# Patient Record
Sex: FEMALE | Race: WHITE | NOT HISPANIC OR LATINO
[De-identification: names, ages, dates, MRNs, and addresses within clinical notes are randomized per-mention and may not be internally consistent; named-entity substitution may affect disease eponyms.]

---

## 2017-04-25 ENCOUNTER — APPOINTMENT (OUTPATIENT)
Dept: ORTHOPEDIC SURGERY | Facility: CLINIC | Age: 67
End: 2017-04-25

## 2017-04-25 DIAGNOSIS — M15.1 HEBERDEN'S NODES (WITH ARTHROPATHY): ICD-10-CM

## 2017-04-25 DIAGNOSIS — D48.1 NEOPLASM OF UNCERTAIN BEHAVIOR OF CONNECTIVE AND OTHER SOFT TISSUE: ICD-10-CM

## 2017-04-25 RX ORDER — AZITHROMYCIN 250 MG/1
250 TABLET, FILM COATED ORAL
Qty: 6 | Refills: 0 | Status: ACTIVE | COMMUNITY
Start: 2016-12-08

## 2017-04-25 RX ORDER — RISEDRONATE SODIUM 150 MG/1
150 TABLET, FILM COATED ORAL
Qty: 3 | Refills: 0 | Status: ACTIVE | COMMUNITY
Start: 2017-04-07

## 2017-04-25 RX ORDER — TRIAMCINOLONE ACETONIDE 1 MG/G
0.1 CREAM TOPICAL
Qty: 80 | Refills: 0 | Status: ACTIVE | COMMUNITY
Start: 2017-04-18

## 2017-05-11 ENCOUNTER — APPOINTMENT (OUTPATIENT)
Dept: ORTHOPEDIC SURGERY | Facility: CLINIC | Age: 67
End: 2017-05-11

## 2017-05-11 RX ORDER — BENZONATATE 100 MG/1
100 CAPSULE ORAL
Qty: 30 | Refills: 0 | Status: ACTIVE | COMMUNITY
Start: 2017-04-28

## 2017-05-11 RX ORDER — FLUTICASONE PROPIONATE 50 UG/1
50 SPRAY, METERED NASAL
Qty: 16 | Refills: 0 | Status: ACTIVE | COMMUNITY
Start: 2017-04-28

## 2017-05-15 ENCOUNTER — OUTPATIENT (OUTPATIENT)
Dept: OUTPATIENT SERVICES | Facility: HOSPITAL | Age: 67
LOS: 1 days | End: 2017-05-15
Payer: MEDICARE

## 2017-05-15 VITALS
TEMPERATURE: 98 F | OXYGEN SATURATION: 98 % | HEART RATE: 64 BPM | DIASTOLIC BLOOD PRESSURE: 75 MMHG | HEIGHT: 61 IN | RESPIRATION RATE: 16 BRPM | SYSTOLIC BLOOD PRESSURE: 138 MMHG | WEIGHT: 147.05 LBS

## 2017-05-15 DIAGNOSIS — M67.441 GANGLION, RIGHT HAND: ICD-10-CM

## 2017-05-15 DIAGNOSIS — M25.841 OTHER SPECIFIED JOINT DISORDERS, RIGHT HAND: ICD-10-CM

## 2017-05-15 DIAGNOSIS — Z98.890 OTHER SPECIFIED POSTPROCEDURAL STATES: Chronic | ICD-10-CM

## 2017-05-15 DIAGNOSIS — M19.041 PRIMARY OSTEOARTHRITIS, RIGHT HAND: ICD-10-CM

## 2017-05-15 DIAGNOSIS — Z01.818 ENCOUNTER FOR OTHER PREPROCEDURAL EXAMINATION: ICD-10-CM

## 2017-05-15 LAB
ANION GAP SERPL CALC-SCNC: 15 MMOL/L — SIGNIFICANT CHANGE UP (ref 5–17)
BUN SERPL-MCNC: 26 MG/DL — HIGH (ref 7–23)
CALCIUM SERPL-MCNC: 9.3 MG/DL — SIGNIFICANT CHANGE UP (ref 8.4–10.5)
CHLORIDE SERPL-SCNC: 102 MMOL/L — SIGNIFICANT CHANGE UP (ref 96–108)
CO2 SERPL-SCNC: 26 MMOL/L — SIGNIFICANT CHANGE UP (ref 22–31)
CREAT SERPL-MCNC: 0.56 MG/DL — SIGNIFICANT CHANGE UP (ref 0.5–1.3)
GLUCOSE SERPL-MCNC: 86 MG/DL — SIGNIFICANT CHANGE UP (ref 70–99)
HCT VFR BLD CALC: 41.2 % — SIGNIFICANT CHANGE UP (ref 34.5–45)
HGB BLD-MCNC: 13.1 G/DL — SIGNIFICANT CHANGE UP (ref 11.5–15.5)
MCHC RBC-ENTMCNC: 30.4 PG — SIGNIFICANT CHANGE UP (ref 27–34)
MCHC RBC-ENTMCNC: 31.8 GM/DL — LOW (ref 32–36)
MCV RBC AUTO: 95.6 FL — SIGNIFICANT CHANGE UP (ref 80–100)
PLATELET # BLD AUTO: 272 K/UL — SIGNIFICANT CHANGE UP (ref 150–400)
POTASSIUM SERPL-MCNC: 4.8 MMOL/L — SIGNIFICANT CHANGE UP (ref 3.5–5.3)
POTASSIUM SERPL-SCNC: 4.8 MMOL/L — SIGNIFICANT CHANGE UP (ref 3.5–5.3)
RBC # BLD: 4.31 M/UL — SIGNIFICANT CHANGE UP (ref 3.8–5.2)
RBC # FLD: 13.9 % — SIGNIFICANT CHANGE UP (ref 10.3–14.5)
SODIUM SERPL-SCNC: 143 MMOL/L — SIGNIFICANT CHANGE UP (ref 135–145)
WBC # BLD: 7.31 K/UL — SIGNIFICANT CHANGE UP (ref 3.8–10.5)
WBC # FLD AUTO: 7.31 K/UL — SIGNIFICANT CHANGE UP (ref 3.8–10.5)

## 2017-05-15 PROCEDURE — 80048 BASIC METABOLIC PNL TOTAL CA: CPT

## 2017-05-15 PROCEDURE — 85027 COMPLETE CBC AUTOMATED: CPT

## 2017-05-15 RX ORDER — SODIUM CHLORIDE 9 MG/ML
3 INJECTION INTRAMUSCULAR; INTRAVENOUS; SUBCUTANEOUS EVERY 8 HOURS
Qty: 0 | Refills: 0 | Status: DISCONTINUED | OUTPATIENT
Start: 2017-05-19 | End: 2017-06-03

## 2017-05-15 RX ORDER — LIDOCAINE HCL 20 MG/ML
0.2 VIAL (ML) INJECTION ONCE
Qty: 0 | Refills: 0 | Status: DISCONTINUED | OUTPATIENT
Start: 2017-05-19 | End: 2017-06-03

## 2017-05-15 RX ORDER — CEFAZOLIN SODIUM 1 G
2000 VIAL (EA) INJECTION ONCE
Qty: 0 | Refills: 0 | Status: DISCONTINUED | OUTPATIENT
Start: 2017-05-19 | End: 2017-06-03

## 2017-05-15 RX ORDER — ACETAMINOPHEN 500 MG
975 TABLET ORAL ONCE
Qty: 0 | Refills: 0 | Status: COMPLETED | OUTPATIENT
Start: 2017-05-19 | End: 2017-05-19

## 2017-05-15 RX ORDER — CELECOXIB 200 MG/1
200 CAPSULE ORAL ONCE
Qty: 0 | Refills: 0 | Status: COMPLETED | OUTPATIENT
Start: 2017-05-19 | End: 2017-05-19

## 2017-05-15 NOTE — H&P PST ADULT - HISTORY OF PRESENT ILLNESS
66 y.o. female with h/o HTN c/o cyst of Right middle finger for 2 months. She is scheduled for Excision Mucous Cyst, Joint Debridement Distal Interphalangeal Joint Right Long Finger.

## 2017-05-15 NOTE — H&P PST ADULT - PMH
Cyst of joint of hand, right    Heart murmur    History of breast cancer, Right  right breast 11/1997  HTN (hypertension)    Hyperlipidemia    Hyperparathyroidism/history  11/2011  Leg skin lesion, left "pre-melanoma"  "pre-melanoma" excision 3/2013  OA (osteoarthritis) right hand    Osteoporosis Cyst of joint of hand, right    Heart murmur    History of breast cancer, Right  1997  HTN (hypertension)    Hyperlipidemia    Hyperparathyroidism/history  11/2011  Leg skin lesion, left "pre-melanoma"  excision 3/2013  OA (osteoarthritis) right hand    Osteoporosis

## 2017-05-15 NOTE — H&P PST ADULT - ATTENDING COMMENTS
The patient has a large right long finger mucous cyst with large dry central crust. The patient has bitten the cyst in the past. There is no sign of infection at this time, however, there is an increased risk of infection because of this and because of prior drainage. Surgery, cyst excision, joint debridement, possible skin flap rotation are indicated. There is very faint 1mm erythema at the base of the dry crust. Because of the dry translucent skin at the fingernail margin there is a possibility of retraction of the eponychial fold/cuticle resulting in asymmetry of the nail fold and cuticle postoperatively, that could result in a permanent skin/cosmetic deformity. I have stressed the increased risk of infection postoperatively. These and many other risks, complications, limitations have been reviewed and discussed in office and also preoperatively. In addition to the above risks, complications, expectations, post operative activities, wound care, stiffness, increased infection risk, chronic pain, skin scarring and cuticle deformity, need for therapy are just a few of many factors reviewed once again. All questions answered. Patient has expressed acceptance and understanding.

## 2017-05-15 NOTE — H&P PST ADULT - PSH
History of lumpectomy  right breast 11/1997  History of bilateral oophorectomy  "strong family hx of cancer", 7/1999  History of parathyroidectomy  11/2011

## 2017-05-19 ENCOUNTER — RESULT REVIEW (OUTPATIENT)
Age: 67
End: 2017-05-19

## 2017-05-19 ENCOUNTER — APPOINTMENT (OUTPATIENT)
Dept: ORTHOPEDIC SURGERY | Facility: HOSPITAL | Age: 67
End: 2017-05-19

## 2017-05-19 ENCOUNTER — OUTPATIENT (OUTPATIENT)
Dept: OUTPATIENT SERVICES | Facility: HOSPITAL | Age: 67
LOS: 1 days | End: 2017-05-19
Payer: MEDICARE

## 2017-05-19 VITALS
HEART RATE: 58 BPM | OXYGEN SATURATION: 99 % | DIASTOLIC BLOOD PRESSURE: 84 MMHG | WEIGHT: 147.05 LBS | RESPIRATION RATE: 16 BRPM | SYSTOLIC BLOOD PRESSURE: 152 MMHG | HEIGHT: 61 IN | TEMPERATURE: 99 F

## 2017-05-19 VITALS
HEART RATE: 76 BPM | RESPIRATION RATE: 16 BRPM | DIASTOLIC BLOOD PRESSURE: 90 MMHG | OXYGEN SATURATION: 99 % | SYSTOLIC BLOOD PRESSURE: 159 MMHG | TEMPERATURE: 97 F

## 2017-05-19 DIAGNOSIS — M19.041 PRIMARY OSTEOARTHRITIS, RIGHT HAND: ICD-10-CM

## 2017-05-19 DIAGNOSIS — Z98.890 OTHER SPECIFIED POSTPROCEDURAL STATES: Chronic | ICD-10-CM

## 2017-05-19 DIAGNOSIS — M67.441 GANGLION, RIGHT HAND: ICD-10-CM

## 2017-05-19 PROCEDURE — 88304 TISSUE EXAM BY PATHOLOGIST: CPT

## 2017-05-19 PROCEDURE — 26535 REVISE FINGER JOINT: CPT | Mod: F7

## 2017-05-19 PROCEDURE — 88304 TISSUE EXAM BY PATHOLOGIST: CPT | Mod: 26

## 2017-05-19 PROCEDURE — 26160 REMOVE TENDON SHEATH LESION: CPT | Mod: F7

## 2017-05-19 RX ORDER — CELECOXIB 200 MG/1
200 CAPSULE ORAL ONCE
Qty: 0 | Refills: 0 | Status: DISCONTINUED | OUTPATIENT
Start: 2017-05-19 | End: 2017-06-03

## 2017-05-19 RX ORDER — RISEDRONATE SODIUM 25.8; 4.2 MG/1; MG/1
325 TABLET, FILM COATED ORAL
Qty: 0 | Refills: 0 | COMMUNITY

## 2017-05-19 RX ORDER — OXYCODONE HYDROCHLORIDE 5 MG/1
5 TABLET ORAL ONCE
Qty: 0 | Refills: 0 | Status: DISCONTINUED | OUTPATIENT
Start: 2017-05-19 | End: 2017-05-19

## 2017-05-19 RX ORDER — ATENOLOL 25 MG/1
1 TABLET ORAL
Qty: 0 | Refills: 0 | COMMUNITY

## 2017-05-19 RX ORDER — ACETAMINOPHEN 500 MG
2 TABLET ORAL
Qty: 0 | Refills: 0 | COMMUNITY

## 2017-05-19 RX ORDER — SODIUM CHLORIDE 9 MG/ML
1000 INJECTION, SOLUTION INTRAVENOUS
Qty: 0 | Refills: 0 | Status: DISCONTINUED | OUTPATIENT
Start: 2017-05-19 | End: 2017-06-03

## 2017-05-19 RX ORDER — ASCORBIC ACID 60 MG
1 TABLET,CHEWABLE ORAL
Qty: 0 | Refills: 0 | COMMUNITY

## 2017-05-19 RX ORDER — CHOLECALCIFEROL (VITAMIN D3) 125 MCG
1 CAPSULE ORAL
Qty: 0 | Refills: 0 | COMMUNITY

## 2017-05-19 RX ORDER — ONDANSETRON 8 MG/1
4 TABLET, FILM COATED ORAL ONCE
Qty: 0 | Refills: 0 | Status: DISCONTINUED | OUTPATIENT
Start: 2017-05-19 | End: 2017-06-03

## 2017-05-19 RX ORDER — FAMOTIDINE 10 MG/ML
0 INJECTION INTRAVENOUS
Qty: 0 | Refills: 0 | COMMUNITY

## 2017-05-19 RX ORDER — ATORVASTATIN CALCIUM 80 MG/1
1 TABLET, FILM COATED ORAL
Qty: 0 | Refills: 0 | COMMUNITY

## 2017-05-19 RX ADMIN — Medication 975 MILLIGRAM(S): at 10:08

## 2017-05-19 RX ADMIN — CELECOXIB 200 MILLIGRAM(S): 200 CAPSULE ORAL at 10:08

## 2017-05-19 NOTE — ASU DISCHARGE PLAN (ADULT/PEDIATRIC). - MEDICATION SUMMARY - MEDICATIONS TO TAKE
I will START or STAY ON the medications listed below when I get home from the hospital:    calcium  -- 1200 milligram(s) by mouth once a day  -- Indication: For home med    Tylenol 325 mg oral tablet  -- 2 tab(s) by mouth every 4 hours, As Needed  -- Indication: For Pain    Lipitor 20 mg oral tablet  -- 1 tab(s) by mouth once a day  -- Indication: For home med    atenolol 25 mg oral tablet  -- 1 tab(s) by mouth once a day  -- Indication: For home med    Actonel  -- 325 milligram(s) by mouth once a day  -- Indication: For home med    Vitamin C 500 mg oral tablet  -- 1 tab(s) by mouth once a day  -- Indication: For home med    Vitamin D3 1000 intl units oral capsule  -- 1 cap(s) by mouth once a day  -- Indication: For home med

## 2017-05-19 NOTE — ASU DISCHARGE PLAN (ADULT/PEDIATRIC). - MEDICATION SUMMARY - MEDICATIONS TO STOP TAKING
I will STOP taking the medications listed below when I get home from the hospital:    Pepcid 20 mg oral tablet  --  by mouth 2 doses preop

## 2017-05-19 NOTE — ASU DISCHARGE PLAN (ADULT/PEDIATRIC). - NOTIFY
Numbness, color, or temperature change to extremity/Bleeding that does not stop/Swelling that continues/Pain not relieved by Medications Bleeding that does not stop/Fever greater than 101/Numbness, color, or temperature change to extremity/Pain not relieved by Medications/Swelling that continues

## 2017-05-19 NOTE — ASU PATIENT PROFILE, ADULT - PMH
Cyst of joint of hand, right    Heart murmur    History of breast cancer, Right  1997  HTN (hypertension)    Hyperlipidemia    Hyperparathyroidism/history  11/2011  Leg skin lesion, left "pre-melanoma"  excision 3/2013  OA (osteoarthritis) right hand    Osteoporosis

## 2017-05-20 ENCOUNTER — TRANSCRIPTION ENCOUNTER (OUTPATIENT)
Age: 67
End: 2017-05-20

## 2017-05-23 LAB — SURGICAL PATHOLOGY STUDY: SIGNIFICANT CHANGE UP

## 2017-05-30 ENCOUNTER — APPOINTMENT (OUTPATIENT)
Dept: ORTHOPEDIC SURGERY | Facility: CLINIC | Age: 67
End: 2017-05-30

## 2017-07-06 ENCOUNTER — TRANSCRIPTION ENCOUNTER (OUTPATIENT)
Age: 67
End: 2017-07-06

## 2017-07-18 ENCOUNTER — APPOINTMENT (OUTPATIENT)
Dept: ORTHOPEDIC SURGERY | Facility: CLINIC | Age: 67
End: 2017-07-18

## 2017-07-18 DIAGNOSIS — M19.041 PRIMARY OSTEOARTHRITIS, RIGHT HAND: ICD-10-CM

## 2017-07-18 DIAGNOSIS — M67.441 GANGLION, RIGHT HAND: ICD-10-CM

## 2018-01-31 ENCOUNTER — APPOINTMENT (OUTPATIENT)
Dept: SURGERY | Facility: CLINIC | Age: 68
End: 2018-01-31
Payer: MEDICARE

## 2018-01-31 PROCEDURE — 99213K: CUSTOM

## 2018-12-26 PROBLEM — R01.1 CARDIAC MURMUR, UNSPECIFIED: Chronic | Status: ACTIVE | Noted: 2017-05-15

## 2018-12-26 PROBLEM — M81.0 AGE-RELATED OSTEOPOROSIS WITHOUT CURRENT PATHOLOGICAL FRACTURE: Chronic | Status: ACTIVE | Noted: 2017-05-15

## 2018-12-26 PROBLEM — M25.841: Chronic | Status: ACTIVE | Noted: 2017-05-15

## 2019-01-14 ENCOUNTER — OUTPATIENT (OUTPATIENT)
Dept: OUTPATIENT SERVICES | Facility: HOSPITAL | Age: 69
LOS: 1 days | End: 2019-01-14
Payer: MEDICARE

## 2019-01-14 ENCOUNTER — APPOINTMENT (OUTPATIENT)
Dept: MAMMOGRAPHY | Facility: CLINIC | Age: 69
End: 2019-01-14
Payer: MEDICARE

## 2019-01-14 ENCOUNTER — APPOINTMENT (OUTPATIENT)
Dept: ULTRASOUND IMAGING | Facility: CLINIC | Age: 69
End: 2019-01-14
Payer: MEDICARE

## 2019-01-14 DIAGNOSIS — Z98.890 OTHER SPECIFIED POSTPROCEDURAL STATES: Chronic | ICD-10-CM

## 2019-01-14 DIAGNOSIS — Z00.8 ENCOUNTER FOR OTHER GENERAL EXAMINATION: ICD-10-CM

## 2019-01-14 PROCEDURE — 76641 ULTRASOUND BREAST COMPLETE: CPT | Mod: 26,50

## 2019-01-14 PROCEDURE — 77067 SCR MAMMO BI INCL CAD: CPT

## 2019-01-14 PROCEDURE — 77063 BREAST TOMOSYNTHESIS BI: CPT

## 2019-01-14 PROCEDURE — 77067 SCR MAMMO BI INCL CAD: CPT | Mod: 26

## 2019-01-14 PROCEDURE — 76641 ULTRASOUND BREAST COMPLETE: CPT

## 2019-01-14 PROCEDURE — 77063 BREAST TOMOSYNTHESIS BI: CPT | Mod: 26

## 2019-03-26 ENCOUNTER — APPOINTMENT (OUTPATIENT)
Dept: INFECTIOUS DISEASE | Facility: CLINIC | Age: 69
End: 2019-03-26
Payer: SELF-PAY

## 2019-03-26 DIAGNOSIS — Z71.89 OTHER SPECIFIED COUNSELING: ICD-10-CM

## 2019-03-26 PROCEDURE — 90690 TYPHOID VACCINE ORAL: CPT

## 2019-03-26 PROCEDURE — 90472 IMMUNIZATION ADMIN EACH ADD: CPT | Mod: NC

## 2019-03-26 PROCEDURE — 90632 HEPA VACCINE ADULT IM: CPT

## 2019-03-26 PROCEDURE — 99401 PREV MED CNSL INDIV APPRX 15: CPT | Mod: 25

## 2019-03-26 PROCEDURE — 90473 IMMUNE ADMIN ORAL/NASAL: CPT | Mod: NC

## 2019-03-26 RX ORDER — ATOVAQUONE AND PROGUANIL HYDROCHLORIDE 250; 100 MG/1; MG/1
250-100 TABLET, FILM COATED ORAL DAILY
Qty: 23 | Refills: 0 | Status: ACTIVE | COMMUNITY
Start: 2019-03-26 | End: 1900-01-01

## 2019-03-26 RX ORDER — AZITHROMYCIN 500 MG/1
500 TABLET, FILM COATED ORAL
Qty: 3 | Refills: 0 | Status: ACTIVE | COMMUNITY
Start: 2019-03-26 | End: 1900-01-01

## 2019-05-15 ENCOUNTER — APPOINTMENT (OUTPATIENT)
Dept: SURGERY | Facility: CLINIC | Age: 69
End: 2019-05-15
Payer: MEDICARE

## 2019-05-15 PROCEDURE — 99213K: CUSTOM

## 2019-05-23 ENCOUNTER — TRANSCRIPTION ENCOUNTER (OUTPATIENT)
Age: 69
End: 2019-05-23

## 2019-06-03 ENCOUNTER — APPOINTMENT (OUTPATIENT)
Dept: ORTHOPEDIC SURGERY | Facility: CLINIC | Age: 69
End: 2019-06-03
Payer: MEDICARE

## 2019-06-03 VITALS — SYSTOLIC BLOOD PRESSURE: 171 MMHG | HEART RATE: 54 BPM | DIASTOLIC BLOOD PRESSURE: 91 MMHG

## 2019-06-03 DIAGNOSIS — M19.042 PRIMARY OSTEOARTHRITIS, LEFT HAND: ICD-10-CM

## 2019-06-03 DIAGNOSIS — M67.442 GANGLION, LEFT HAND: ICD-10-CM

## 2019-06-03 PROCEDURE — 99213 OFFICE O/P EST LOW 20 MIN: CPT

## 2019-07-24 ENCOUNTER — APPOINTMENT (OUTPATIENT)
Dept: ULTRASOUND IMAGING | Facility: CLINIC | Age: 69
End: 2019-07-24
Payer: MEDICARE

## 2019-07-24 ENCOUNTER — OUTPATIENT (OUTPATIENT)
Dept: OUTPATIENT SERVICES | Facility: HOSPITAL | Age: 69
LOS: 1 days | End: 2019-07-24
Payer: MEDICARE

## 2019-07-24 DIAGNOSIS — Z00.8 ENCOUNTER FOR OTHER GENERAL EXAMINATION: ICD-10-CM

## 2019-07-24 DIAGNOSIS — Z98.890 OTHER SPECIFIED POSTPROCEDURAL STATES: Chronic | ICD-10-CM

## 2019-07-24 PROCEDURE — 76770 US EXAM ABDO BACK WALL COMP: CPT

## 2019-07-24 PROCEDURE — 76770 US EXAM ABDO BACK WALL COMP: CPT | Mod: 26

## 2019-11-29 ENCOUNTER — TRANSCRIPTION ENCOUNTER (OUTPATIENT)
Age: 69
End: 2019-11-29

## 2020-08-10 NOTE — H&P PST ADULT - VENOUS THROMBOEMBOLISM CURRENT STATUS
LMTCB    MICROGESTIN FE 1.5/30 1.5-30 MG-MCG Oral Tab 84 tablet 0 8/3/2020     Sig - Route: Take 1 tablet by mouth daily. - Oral    Notes to Pharmacy: Please inform Patient to call office to schedule annual visit for future refills. Pharmacy     [facility] [id number] [phone], [phone]       LOV with EB was 2/14/19; no future appmnts scheduled. minor surgery planned

## 2020-08-19 ENCOUNTER — APPOINTMENT (OUTPATIENT)
Dept: SURGERY | Facility: CLINIC | Age: 70
End: 2020-08-19
Payer: MEDICARE

## 2020-08-19 PROCEDURE — 99213K: CUSTOM

## 2020-09-25 ENCOUNTER — RESULT REVIEW (OUTPATIENT)
Age: 70
End: 2020-09-25

## 2020-09-25 ENCOUNTER — APPOINTMENT (OUTPATIENT)
Dept: ULTRASOUND IMAGING | Facility: CLINIC | Age: 70
End: 2020-09-25
Payer: MEDICARE

## 2020-09-25 ENCOUNTER — APPOINTMENT (OUTPATIENT)
Dept: MAMMOGRAPHY | Facility: CLINIC | Age: 70
End: 2020-09-25
Payer: MEDICARE

## 2020-09-25 ENCOUNTER — OUTPATIENT (OUTPATIENT)
Dept: OUTPATIENT SERVICES | Facility: HOSPITAL | Age: 70
LOS: 1 days | End: 2020-09-25
Payer: MEDICARE

## 2020-09-25 DIAGNOSIS — Z00.8 ENCOUNTER FOR OTHER GENERAL EXAMINATION: ICD-10-CM

## 2020-09-25 DIAGNOSIS — Z98.890 OTHER SPECIFIED POSTPROCEDURAL STATES: Chronic | ICD-10-CM

## 2020-09-25 DIAGNOSIS — Z00.00 ENCOUNTER FOR GENERAL ADULT MEDICAL EXAMINATION WITHOUT ABNORMAL FINDINGS: ICD-10-CM

## 2020-09-25 PROCEDURE — 77063 BREAST TOMOSYNTHESIS BI: CPT | Mod: 26

## 2020-09-25 PROCEDURE — 77067 SCR MAMMO BI INCL CAD: CPT | Mod: 26

## 2020-09-25 PROCEDURE — 77067 SCR MAMMO BI INCL CAD: CPT

## 2020-09-25 PROCEDURE — 76641 ULTRASOUND BREAST COMPLETE: CPT

## 2020-09-25 PROCEDURE — 76641 ULTRASOUND BREAST COMPLETE: CPT | Mod: 26,50

## 2020-09-25 PROCEDURE — 77063 BREAST TOMOSYNTHESIS BI: CPT

## 2021-03-18 ENCOUNTER — RESULT REVIEW (OUTPATIENT)
Age: 71
End: 2021-03-18

## 2021-03-18 ENCOUNTER — APPOINTMENT (OUTPATIENT)
Dept: MRI IMAGING | Facility: CLINIC | Age: 71
End: 2021-03-18
Payer: MEDICARE

## 2021-03-18 ENCOUNTER — OUTPATIENT (OUTPATIENT)
Dept: OUTPATIENT SERVICES | Facility: HOSPITAL | Age: 71
LOS: 1 days | End: 2021-03-18
Payer: MEDICARE

## 2021-03-18 DIAGNOSIS — Z98.890 OTHER SPECIFIED POSTPROCEDURAL STATES: Chronic | ICD-10-CM

## 2021-03-18 DIAGNOSIS — Z00.00 ENCOUNTER FOR GENERAL ADULT MEDICAL EXAMINATION WITHOUT ABNORMAL FINDINGS: ICD-10-CM

## 2021-03-18 PROCEDURE — C8908: CPT

## 2021-03-18 PROCEDURE — C8937: CPT

## 2021-03-18 PROCEDURE — A9585: CPT

## 2021-03-18 PROCEDURE — 77049 MRI BREAST C-+ W/CAD BI: CPT | Mod: 26,MH

## 2021-07-21 ENCOUNTER — APPOINTMENT (OUTPATIENT)
Dept: SURGERY | Facility: CLINIC | Age: 71
End: 2021-07-21
Payer: MEDICARE

## 2021-07-21 PROCEDURE — 99213K: CUSTOM

## 2021-09-30 ENCOUNTER — RESULT REVIEW (OUTPATIENT)
Age: 71
End: 2021-09-30

## 2021-09-30 ENCOUNTER — APPOINTMENT (OUTPATIENT)
Dept: MAMMOGRAPHY | Facility: CLINIC | Age: 71
End: 2021-09-30
Payer: MEDICARE

## 2021-09-30 ENCOUNTER — OUTPATIENT (OUTPATIENT)
Dept: OUTPATIENT SERVICES | Facility: HOSPITAL | Age: 71
LOS: 1 days | End: 2021-09-30
Payer: MEDICARE

## 2021-09-30 ENCOUNTER — APPOINTMENT (OUTPATIENT)
Dept: ULTRASOUND IMAGING | Facility: CLINIC | Age: 71
End: 2021-09-30
Payer: MEDICARE

## 2021-09-30 DIAGNOSIS — Z98.890 OTHER SPECIFIED POSTPROCEDURAL STATES: Chronic | ICD-10-CM

## 2021-09-30 DIAGNOSIS — Z00.8 ENCOUNTER FOR OTHER GENERAL EXAMINATION: ICD-10-CM

## 2021-09-30 PROCEDURE — 77063 BREAST TOMOSYNTHESIS BI: CPT

## 2021-09-30 PROCEDURE — 77067 SCR MAMMO BI INCL CAD: CPT

## 2021-09-30 PROCEDURE — 77063 BREAST TOMOSYNTHESIS BI: CPT | Mod: 26

## 2021-09-30 PROCEDURE — 77067 SCR MAMMO BI INCL CAD: CPT | Mod: 26

## 2021-09-30 PROCEDURE — 76641 ULTRASOUND BREAST COMPLETE: CPT

## 2021-09-30 PROCEDURE — 76641 ULTRASOUND BREAST COMPLETE: CPT | Mod: 26,50

## 2021-11-04 ENCOUNTER — TRANSCRIPTION ENCOUNTER (OUTPATIENT)
Age: 71
End: 2021-11-04

## 2022-07-06 ENCOUNTER — APPOINTMENT (OUTPATIENT)
Dept: SURGERY | Facility: CLINIC | Age: 72
End: 2022-07-06

## 2022-07-06 PROCEDURE — 99213K: CUSTOM

## 2022-07-08 ENCOUNTER — APPOINTMENT (OUTPATIENT)
Dept: MRI IMAGING | Facility: CLINIC | Age: 72
End: 2022-07-08

## 2022-07-08 ENCOUNTER — OUTPATIENT (OUTPATIENT)
Dept: OUTPATIENT SERVICES | Facility: HOSPITAL | Age: 72
LOS: 1 days | End: 2022-07-08
Payer: MEDICARE

## 2022-07-08 DIAGNOSIS — Z98.890 OTHER SPECIFIED POSTPROCEDURAL STATES: Chronic | ICD-10-CM

## 2022-07-08 DIAGNOSIS — Z00.8 ENCOUNTER FOR OTHER GENERAL EXAMINATION: ICD-10-CM

## 2022-07-08 PROCEDURE — 77049 MRI BREAST C-+ W/CAD BI: CPT | Mod: 26,MH

## 2022-07-08 PROCEDURE — A9585: CPT

## 2022-07-08 PROCEDURE — C8908: CPT | Mod: MH

## 2022-07-08 PROCEDURE — C8937: CPT

## 2023-02-27 ENCOUNTER — NON-APPOINTMENT (OUTPATIENT)
Age: 73
End: 2023-02-27

## 2023-03-22 ENCOUNTER — TRANSCRIPTION ENCOUNTER (OUTPATIENT)
Age: 73
End: 2023-03-22

## 2023-03-22 ENCOUNTER — APPOINTMENT (OUTPATIENT)
Dept: MAMMOGRAPHY | Facility: CLINIC | Age: 73
End: 2023-03-22
Payer: MEDICARE

## 2023-03-22 ENCOUNTER — OUTPATIENT (OUTPATIENT)
Dept: OUTPATIENT SERVICES | Facility: HOSPITAL | Age: 73
LOS: 1 days | End: 2023-03-22
Payer: MEDICARE

## 2023-03-22 ENCOUNTER — APPOINTMENT (OUTPATIENT)
Dept: ULTRASOUND IMAGING | Facility: CLINIC | Age: 73
End: 2023-03-22
Payer: MEDICARE

## 2023-03-22 DIAGNOSIS — Z98.890 OTHER SPECIFIED POSTPROCEDURAL STATES: Chronic | ICD-10-CM

## 2023-03-22 DIAGNOSIS — Z00.00 ENCOUNTER FOR GENERAL ADULT MEDICAL EXAMINATION WITHOUT ABNORMAL FINDINGS: ICD-10-CM

## 2023-03-22 PROCEDURE — 77063 BREAST TOMOSYNTHESIS BI: CPT | Mod: 26

## 2023-03-22 PROCEDURE — 77067 SCR MAMMO BI INCL CAD: CPT | Mod: 26

## 2023-03-22 PROCEDURE — 76641 ULTRASOUND BREAST COMPLETE: CPT | Mod: 26,50,GA

## 2023-03-22 PROCEDURE — 77063 BREAST TOMOSYNTHESIS BI: CPT

## 2023-03-22 PROCEDURE — 76641 ULTRASOUND BREAST COMPLETE: CPT

## 2023-03-22 PROCEDURE — 77067 SCR MAMMO BI INCL CAD: CPT

## 2023-07-07 ENCOUNTER — EMERGENCY (EMERGENCY)
Facility: HOSPITAL | Age: 73
LOS: 0 days | Discharge: HOME | End: 2023-07-07
Attending: STUDENT IN AN ORGANIZED HEALTH CARE EDUCATION/TRAINING PROGRAM
Payer: MEDICARE

## 2023-07-07 VITALS
SYSTOLIC BLOOD PRESSURE: 216 MMHG | TEMPERATURE: 98 F | OXYGEN SATURATION: 99 % | HEART RATE: 78 BPM | DIASTOLIC BLOOD PRESSURE: 91 MMHG | RESPIRATION RATE: 19 BRPM

## 2023-07-07 VITALS — WEIGHT: 139.99 LBS | HEIGHT: 61 IN

## 2023-07-07 DIAGNOSIS — Z85.3 PERSONAL HISTORY OF MALIGNANT NEOPLASM OF BREAST: ICD-10-CM

## 2023-07-07 DIAGNOSIS — Z98.890 OTHER SPECIFIED POSTPROCEDURAL STATES: Chronic | ICD-10-CM

## 2023-07-07 DIAGNOSIS — E78.5 HYPERLIPIDEMIA, UNSPECIFIED: ICD-10-CM

## 2023-07-07 DIAGNOSIS — Z90.722 ACQUIRED ABSENCE OF OVARIES, BILATERAL: ICD-10-CM

## 2023-07-07 DIAGNOSIS — R10.9 UNSPECIFIED ABDOMINAL PAIN: ICD-10-CM

## 2023-07-07 DIAGNOSIS — M19.041 PRIMARY OSTEOARTHRITIS, RIGHT HAND: ICD-10-CM

## 2023-07-07 DIAGNOSIS — M81.0 AGE-RELATED OSTEOPOROSIS WITHOUT CURRENT PATHOLOGICAL FRACTURE: ICD-10-CM

## 2023-07-07 DIAGNOSIS — I45.10 UNSPECIFIED RIGHT BUNDLE-BRANCH BLOCK: ICD-10-CM

## 2023-07-07 DIAGNOSIS — Z90.89 ACQUIRED ABSENCE OF OTHER ORGANS: ICD-10-CM

## 2023-07-07 DIAGNOSIS — I10 ESSENTIAL (PRIMARY) HYPERTENSION: ICD-10-CM

## 2023-07-07 LAB
ABO RH CONFIRMATION: SIGNIFICANT CHANGE UP
ALBUMIN SERPL ELPH-MCNC: 3.5 G/DL — SIGNIFICANT CHANGE UP (ref 3.3–5)
ALP SERPL-CCNC: 54 U/L — SIGNIFICANT CHANGE UP (ref 40–120)
ALT FLD-CCNC: 29 U/L — SIGNIFICANT CHANGE UP (ref 12–78)
ANION GAP SERPL CALC-SCNC: 3 MMOL/L — LOW (ref 5–17)
APTT BLD: 30.7 SEC — SIGNIFICANT CHANGE UP (ref 27.5–35.5)
AST SERPL-CCNC: 20 U/L — SIGNIFICANT CHANGE UP (ref 15–37)
BASOPHILS # BLD AUTO: 0.04 K/UL — SIGNIFICANT CHANGE UP (ref 0–0.2)
BASOPHILS NFR BLD AUTO: 0.4 % — SIGNIFICANT CHANGE UP (ref 0–2)
BILIRUB SERPL-MCNC: 1 MG/DL — SIGNIFICANT CHANGE UP (ref 0.2–1.2)
BUN SERPL-MCNC: 14 MG/DL — SIGNIFICANT CHANGE UP (ref 7–23)
CALCIUM SERPL-MCNC: 9.2 MG/DL — SIGNIFICANT CHANGE UP (ref 8.5–10.1)
CHLORIDE SERPL-SCNC: 107 MMOL/L — SIGNIFICANT CHANGE UP (ref 96–108)
CO2 SERPL-SCNC: 31 MMOL/L — SIGNIFICANT CHANGE UP (ref 22–31)
CREAT SERPL-MCNC: 0.72 MG/DL — SIGNIFICANT CHANGE UP (ref 0.5–1.3)
EGFR: 88 ML/MIN/1.73M2 — SIGNIFICANT CHANGE UP
EOSINOPHIL # BLD AUTO: 0.03 K/UL — SIGNIFICANT CHANGE UP (ref 0–0.5)
EOSINOPHIL NFR BLD AUTO: 0.3 % — SIGNIFICANT CHANGE UP (ref 0–6)
GLUCOSE SERPL-MCNC: 104 MG/DL — HIGH (ref 70–99)
HCT VFR BLD CALC: 42.6 % — SIGNIFICANT CHANGE UP (ref 34.5–45)
HGB BLD-MCNC: 14 G/DL — SIGNIFICANT CHANGE UP (ref 11.5–15.5)
IMM GRANULOCYTES NFR BLD AUTO: 0.4 % — SIGNIFICANT CHANGE UP (ref 0–0.9)
INR BLD: 1.16 RATIO — SIGNIFICANT CHANGE UP (ref 0.88–1.16)
LIDOCAIN IGE QN: 120 U/L — SIGNIFICANT CHANGE UP (ref 73–393)
LYMPHOCYTES # BLD AUTO: 1.01 K/UL — SIGNIFICANT CHANGE UP (ref 1–3.3)
LYMPHOCYTES # BLD AUTO: 10.5 % — LOW (ref 13–44)
MCHC RBC-ENTMCNC: 30.8 PG — SIGNIFICANT CHANGE UP (ref 27–34)
MCHC RBC-ENTMCNC: 32.9 GM/DL — SIGNIFICANT CHANGE UP (ref 32–36)
MCV RBC AUTO: 93.6 FL — SIGNIFICANT CHANGE UP (ref 80–100)
MONOCYTES # BLD AUTO: 0.77 K/UL — SIGNIFICANT CHANGE UP (ref 0–0.9)
MONOCYTES NFR BLD AUTO: 8 % — SIGNIFICANT CHANGE UP (ref 2–14)
NEUTROPHILS # BLD AUTO: 7.69 K/UL — HIGH (ref 1.8–7.4)
NEUTROPHILS NFR BLD AUTO: 80.4 % — HIGH (ref 43–77)
PLATELET # BLD AUTO: 238 K/UL — SIGNIFICANT CHANGE UP (ref 150–400)
POTASSIUM SERPL-MCNC: 3.8 MMOL/L — SIGNIFICANT CHANGE UP (ref 3.5–5.3)
POTASSIUM SERPL-SCNC: 3.8 MMOL/L — SIGNIFICANT CHANGE UP (ref 3.5–5.3)
PROT SERPL-MCNC: 7.1 GM/DL — SIGNIFICANT CHANGE UP (ref 6–8.3)
PROTHROM AB SERPL-ACNC: 13.5 SEC — HIGH (ref 10.5–13.4)
RBC # BLD: 4.55 M/UL — SIGNIFICANT CHANGE UP (ref 3.8–5.2)
RBC # FLD: 13.5 % — SIGNIFICANT CHANGE UP (ref 10.3–14.5)
SODIUM SERPL-SCNC: 141 MMOL/L — SIGNIFICANT CHANGE UP (ref 135–145)
WBC # BLD: 9.58 K/UL — SIGNIFICANT CHANGE UP (ref 3.8–10.5)
WBC # FLD AUTO: 9.58 K/UL — SIGNIFICANT CHANGE UP (ref 3.8–10.5)

## 2023-07-07 PROCEDURE — 93010 ELECTROCARDIOGRAM REPORT: CPT

## 2023-07-07 PROCEDURE — 93005 ELECTROCARDIOGRAM TRACING: CPT

## 2023-07-07 PROCEDURE — 86901 BLOOD TYPING SEROLOGIC RH(D): CPT

## 2023-07-07 PROCEDURE — 85610 PROTHROMBIN TIME: CPT

## 2023-07-07 PROCEDURE — 85025 COMPLETE CBC W/AUTO DIFF WBC: CPT

## 2023-07-07 PROCEDURE — 71045 X-RAY EXAM CHEST 1 VIEW: CPT

## 2023-07-07 PROCEDURE — 86900 BLOOD TYPING SEROLOGIC ABO: CPT

## 2023-07-07 PROCEDURE — 80053 COMPREHEN METABOLIC PANEL: CPT

## 2023-07-07 PROCEDURE — 74177 CT ABD & PELVIS W/CONTRAST: CPT | Mod: MA

## 2023-07-07 PROCEDURE — 83690 ASSAY OF LIPASE: CPT

## 2023-07-07 PROCEDURE — 71045 X-RAY EXAM CHEST 1 VIEW: CPT | Mod: 26

## 2023-07-07 PROCEDURE — 86850 RBC ANTIBODY SCREEN: CPT

## 2023-07-07 PROCEDURE — 99285 EMERGENCY DEPT VISIT HI MDM: CPT

## 2023-07-07 PROCEDURE — 36415 COLL VENOUS BLD VENIPUNCTURE: CPT

## 2023-07-07 PROCEDURE — 74177 CT ABD & PELVIS W/CONTRAST: CPT | Mod: 26,MA

## 2023-07-07 PROCEDURE — 85730 THROMBOPLASTIN TIME PARTIAL: CPT

## 2023-07-07 PROCEDURE — 96374 THER/PROPH/DIAG INJ IV PUSH: CPT | Mod: XU

## 2023-07-07 PROCEDURE — 99285 EMERGENCY DEPT VISIT HI MDM: CPT | Mod: 25

## 2023-07-07 RX ORDER — SODIUM CHLORIDE 9 MG/ML
1000 INJECTION INTRAMUSCULAR; INTRAVENOUS; SUBCUTANEOUS ONCE
Refills: 0 | Status: COMPLETED | OUTPATIENT
Start: 2023-07-07 | End: 2023-07-07

## 2023-07-07 RX ORDER — FAMOTIDINE 10 MG/ML
20 INJECTION INTRAVENOUS ONCE
Refills: 0 | Status: COMPLETED | OUTPATIENT
Start: 2023-07-07 | End: 2023-07-07

## 2023-07-07 RX ADMIN — Medication 30 MILLILITER(S): at 11:16

## 2023-07-07 RX ADMIN — SODIUM CHLORIDE 1000 MILLILITER(S): 9 INJECTION INTRAMUSCULAR; INTRAVENOUS; SUBCUTANEOUS at 12:40

## 2023-07-07 RX ADMIN — FAMOTIDINE 20 MILLIGRAM(S): 10 INJECTION INTRAVENOUS at 11:16

## 2023-07-07 RX ADMIN — SODIUM CHLORIDE 1000 MILLILITER(S): 9 INJECTION INTRAMUSCULAR; INTRAVENOUS; SUBCUTANEOUS at 11:15

## 2023-07-07 NOTE — ED STATDOCS - CLINICAL SUMMARY MEDICAL DECISION MAKING FREE TEXT BOX
71 y/o female s/p endoscopy and colonoscopy, now with abdominal pain. Screening CXR, EKG, CT abdomen, labs, and reevaluate.

## 2023-07-07 NOTE — ED ADULT NURSE NOTE - CHIEF COMPLAINT QUOTE
SIB GI Dr. Spicer, pt underwent endoscopy/colonoscopy yesterday and states it was "complicated", referred to ED to r/o perforation because during the procedure, Dr. Spicer states he "irritated the colon". pt is taking Levaquin prophylactically, endorsing intermittent upper abd. pain.

## 2023-07-07 NOTE — ED ADULT NURSE NOTE - NSFALLUNIVINTERV_ED_ALL_ED
Bed/Stretcher in lowest position, wheels locked, appropriate side rails in place/Call bell, personal items and telephone in reach/Instruct patient to call for assistance before getting out of bed/chair/stretcher/Non-slip footwear applied when patient is off stretcher/[place] to call system/Physically safe environment - no spills, clutter or unnecessary equipment/Purposeful proactive rounding/Room/bathroom lighting operational, light cord in reach

## 2023-07-07 NOTE — ED STATDOCS - PROGRESS NOTE DETAILS
Remi PGY3  Radiology called regarding 2.4cm small splenic laceration w/o collection and air locules seen in the sigmoid colon consistent w/diverticulosis. Discussed with surgery resident - will see patient shortly. Patient reassessed and is currently stable, will move to trauma room. Seb Calvin: Received radiology results; patient with splenic laceration. Surgical resident immediately called, Dr. Davidson aware and will assume care at this time. Remi PGY3   Final CTAP report did not indicate splenic laceration but rather a mildly complex splenic cyst. Updated patient and  at bedside and also discussed incidental findings of splenic cyst/renal cyst/diverticulosis. Recommended follow up with Dr. Spicer and PMD for further evaluation. Patient states that her pain is gone now and will follow up with Dr. Spicer. Updated Dr. Spicer. Remi PGY3   Final CTAP report did not indicate splenic laceration but rather a mildly complex splenic cyst and confirmed with attending radiologist. Updated patient and  at bedside and also discussed incidental findings of splenic cyst/renal cyst/diverticulosis. Recommended follow up with Dr. Spicer and PMD for further evaluation. Patient states that her pain is gone now and will follow up with Dr. Spicer. Updated Dr. Spicer.

## 2023-07-07 NOTE — ED STATDOCS - NSICDXPASTMEDICALHX_GEN_ALL_CORE_FT
PAST MEDICAL HISTORY:  Cyst of joint of hand, right     Heart murmur     History of breast cancer, Right 1997    HTN (hypertension)     Hyperlipidemia     Hyperparathyroidism/history 11/2011    Leg skin lesion, left "pre-melanoma" excision 3/2013    OA (osteoarthritis) right hand     Osteoporosis

## 2023-07-07 NOTE — ED ADULT NURSE REASSESSMENT NOTE - NS ED NURSE REASSESS COMMENT FT1
received pt in bed. pt a/ox4. states she has some discomfort over ABD. pt brought to trauma room, + spleen lac.

## 2023-07-07 NOTE — ED STATDOCS - PATIENT PORTAL LINK FT
You can access the FollowMyHealth Patient Portal offered by [facility] by registering at the following website: [email or web address]. By joining [employer]’s FollowMyHealth portal, you will also be able to view your health information using other applications (apps) compatible with our system.

## 2023-07-07 NOTE — ED STATDOCS - NSFOLLOWUPINSTRUCTIONS_ED_ALL_ED_FT
You were seen in the emergency department for abdominal pain.   Your workup in the emergency department includes bloodwork, ECG and CT scan of abdomen and pelvis.   You can find the results of all the tests in this discharge packet.   Please follow up with your primary care doctor within 48 hours for continuation of care.   Please follow up with Dr. Spicer within a week for further management. Please continue taking all your prescription medications including levaquin as prescribed by Dr. Spicer. You may take famotidine (pepcid) 20mg 2 times a day for gas.     Return to the emergency department if you experience any new/concerning/worsening symptoms such as but not limited to: fever (>100.3F), intractable nausea, vomiting, chest pain, shortness of breath, worsening abdominal pain, bloody stools.

## 2023-07-07 NOTE — ED ADULT NURSE NOTE - OBJECTIVE STATEMENT
Patient presents to ED for intermittent abdominal pain, pt states "It feels like gas pain". Pt had a colonoscopy yesterday, states "during the colonoscopy, they had a hard time getting the camera through and they took multiple attempts so the doctor started me on antibiotics because the doctor said he may have irritated the colon". Pt denies nausea/vomiting, diarrhea, fever/chills. Pt states decrease in appetite since the colonoscopy.

## 2023-07-07 NOTE — ED ADULT NURSE NOTE - PRIMARY CARE PROVIDER
"History  HPI     COMPREHENSIVE EYE EXAM     In both eyes.  Onset was gradual.  This started 2 days ago.  Charactertized as  blurred vision.  Severity is moderate.  Occurring intermittently.  Context:  distance vision.  Associated symptoms include floaters (stable - no change), itching, discharge (sometimes mattery) and tearing (during reading or focused eyes water).  Negative for eye pain, flashes, previous episodes, dryness and burning.  Treatments tried include glasses and no treatments.  Pain was noted as 0/10.              Comments     New pPt here today for comprehensive eye exam.  OMARI was 2.5 yrs ago in [place]. Glaucoma eye concerns.   Pt states had an episode of foggy vision yesterday that lasted 15 minutes while working out.  Pt was in lying position when foggy episode happened.    Pt denies any medication use for eyes. Pt states she is \"pre-diabetic\"    No results found for: A1C    WILFRIDO Johnston 1:53 PM 07/07/2021             Last edited by Filemon Linn on 7/7/2021  1:53 PM. (History)          Assessment/Plan  (H52.03) Hypermetropia of both eyes  (primary encounter diagnosis)  Comment: Hyperopia with presbyopia both eyes    Plan: REFRACTION   Educated patient on condition and clinical findings. Dispensed spectacle prescription for full time wear. Monitor annually.    (E11.9) Diabetes mellitus type 2 without retinopathy (H)  Comment: No retinopathy both eyes, prediabetic per patient  Plan:  Educated patient on clinical findings and the importance of continued management with primary care physician. Continue management as directed and return to clinic in 1 year for dilated exam, or sooner, as needed.    (H53.10) Subjective vision disturbance  Comment: Reporting transient fogging of vision in both eyes for 15 minutes while exercising, patient denies migraine aura symptoms  Plan:  Letter written to primary care provider (patient to present this letter to provider) regarding symptoms. " Explained that symptoms are not completely consistent with TIA or migraine aura, but testing may be warranted. Patient to return for baseline visual field and continue with primary care provider as needed.    (H40.003) Glaucoma suspect of both eyes  Comment: Significant cupping both eyes, RNFL thickness  both eyes   Plan: OCT Optic Nerve RNFL Spectralis OU (both eyes)         Monitor annually (pending normal visual field findings).    (H25.093) Age-related incipient cataract of both eyes  Comment: Not visually significant  Plan:  No treatment indicated at this time. Monitor annually.    Return to clinic in 1 year for comprehensive eye exam.    Complete documentation of historical and exam elements from today's encounter can  be found in the full encounter summary report (not reduplicated in this progress  note). I personally obtained the chief complaint(s) and history of present illness. I  confirmed and edited as necessary the review of systems, past medical/surgical  history, family history, social history, and examination findings as documented by  others; and I examined the patient myself. I personally reviewed the relevant tests,  images, and reports as documented above. I formulated and edited as necessary the  assessment and plan and discussed the findings and management plan with the  patient and family.    Juma Wilkerson, OD, FAAO   see Provider note

## 2023-07-07 NOTE — ED STATDOCS - OBJECTIVE STATEMENT
71 y/o female with PMHx of HLD, HTN, heart murmur, osteoporosis presents to the ED c/o intermittent 10-15 second episodes of abdominal pain, worse with movement and inspiration, s/p endoscopy and colonscopy yesterday. Denies SOB, chest pain, diarrhea. Patient had routine endoscopy and colonoscopy yesterday with GI Dr. Moshe Spicer, the latter of which patient states irritated the colon lining so she was started on Levaquin last night. 71 y/o female with PMHx of HLD, HTN, heart murmur, osteoporosis presents to the ED c/o intermittent 10-15 second episodes of abdominal pain, worse with movement and inspiration, s/p endoscopy and colonoscopy yesterday. Denies SOB, chest pain, diarrhea. Patient had routine endoscopy and colonoscopy yesterday with GI Dr. Moshe Spicer, the latter of which patient states irritated the colon lining so she was started on Levaquin last night.

## 2023-07-07 NOTE — ED STATDOCS - CARE PROVIDER_API CALL
Moshe Spicer  Gastroenterology  [address] [place] [address], [facility] [address]  [place] [address]  Phone: [phone]  Fax: [phone]  Follow Up Time:

## 2023-07-07 NOTE — ED ADULT NURSE NOTE - NSSUHOSCREENINGYN_ED_ALL_ED
Medication Detail      Disp Refills Start End SHILPA   traZODone (DESYREL) 50 MG tablet 30 tablet 1 1/6/2017  --   Sig: Take 1 tablet (50 mg) by mouth nightly as needed for sleep   Class: E-Prescribe   Route: Oral     Last Office Visit with [facility], [facility] or [facility] prescribing provider:  2/10/2017        Last PHQ-9 score on record=   PHQ-9 SCORE 2/10/2017   Total Score 5       Lab Results   Component Value Date    AST 12 12/13/2016     Lab Results   Component Value Date    ALT 30 12/13/2016       
Yes - the patient is able to be screened

## 2023-07-07 NOTE — ED STATDOCS - ATTENDING CONTRIBUTION TO CARE
I, Arabella Calvin DO,  performed the initial face to face bedside interview with this patient regarding history of present illness, review of symptoms and relevant past medical, social and family history.  I completed an independent physical examination.  I was the initial provider who evaluated this patient. I have signed out the follow up of any pending tests (i.e. labs, radiological studies) to the resident.  I have communicated the patient’s plan of care and disposition with the resident.  The history, relevant review of systems, past medical and surgical history, medical decision making, and physical examination was documented by the scribe in my presence and I attest to the accuracy of the documentation.

## 2023-07-07 NOTE — ED STATDOCS - NSICDXPASTSURGICALHX_GEN_ALL_CORE_FT
PAST SURGICAL HISTORY:  H/O hand surgery - cyst removed form Right hand, 2015    H/O shoulder surgery Right rotator cuff repair, 2010    History of bilateral oophorectomy "strong family hx of cancer", 7/1999    History of lumpectomy right breast 11/1997    History of parathyroidectomy 11/2011

## 2023-09-06 ENCOUNTER — APPOINTMENT (OUTPATIENT)
Dept: SURGERY | Facility: CLINIC | Age: 73
End: 2023-09-06
Payer: MEDICARE

## 2023-09-06 PROCEDURE — 99213K: CUSTOM

## 2023-12-14 ENCOUNTER — OFFICE (OUTPATIENT)
Dept: URBAN - METROPOLITAN AREA CLINIC 70 | Facility: CLINIC | Age: 73
Setting detail: OPHTHALMOLOGY
End: 2023-12-14
Payer: MEDICARE

## 2023-12-14 DIAGNOSIS — H52.7: ICD-10-CM

## 2023-12-14 DIAGNOSIS — H43.811: ICD-10-CM

## 2023-12-14 DIAGNOSIS — D31.31: ICD-10-CM

## 2023-12-14 DIAGNOSIS — H25.13: ICD-10-CM

## 2023-12-14 DIAGNOSIS — H11.042: ICD-10-CM

## 2023-12-14 DIAGNOSIS — D31.32: ICD-10-CM

## 2023-12-14 DIAGNOSIS — H16.223: ICD-10-CM

## 2023-12-14 DIAGNOSIS — H40.033: ICD-10-CM

## 2023-12-14 DIAGNOSIS — H35.54: ICD-10-CM

## 2023-12-14 PROCEDURE — 92015 DETERMINE REFRACTIVE STATE: CPT | Performed by: STUDENT IN AN ORGANIZED HEALTH CARE EDUCATION/TRAINING PROGRAM

## 2023-12-14 PROCEDURE — 92250 FUNDUS PHOTOGRAPHY W/I&R: CPT | Performed by: STUDENT IN AN ORGANIZED HEALTH CARE EDUCATION/TRAINING PROGRAM

## 2023-12-14 PROCEDURE — 92014 COMPRE OPH EXAM EST PT 1/>: CPT | Performed by: STUDENT IN AN ORGANIZED HEALTH CARE EDUCATION/TRAINING PROGRAM

## 2023-12-14 ASSESSMENT — REFRACTION_CURRENTRX
OS_ADD: +2.20
OS_SPHERE: +3.25
OD_SPHERE: +3.00
OS_OVR_VA: 20/
OD_AXIS: 100
OS_VPRISM_DIRECTION: PROGS
OS_OVR_VA: 20/
OD_AXIS: 091
OD_SPHERE: +3.00
OD_OVR_VA: 20/
OS_SPHERE: +3.00
OD_VPRISM_DIRECTION: SV
OD_OVR_VA: 20/
OS_AXIS: 077
OD_CYLINDER: -0.50
OD_ADD: +2.25
OS_VPRISM_DIRECTION: SV
OS_CYLINDER: -0.75
OS_AXIS: 067
OD_CYLINDER: -0.75
OS_CYLINDER: -0.50
OD_ADD: +2.20
OD_VPRISM_DIRECTION: PROGS
OS_ADD: +2.25

## 2023-12-14 ASSESSMENT — SPHEQUIV_DERIVED
OS_SPHEQUIV: 3.375
OD_SPHEQUIV: 2.75
OD_SPHEQUIV: 2.5
OS_SPHEQUIV: 3
OD_SPHEQUIV: 2.25
OD_SPHEQUIV: 2.75
OS_SPHEQUIV: 3.375
OS_SPHEQUIV: 3.25

## 2023-12-14 ASSESSMENT — REFRACTION_MANIFEST
OS_VA1: 20/20-2
OS_SPHERE: +3.50
OD_CYLINDER: -1.50
OS_AXIS: 090
OS_AXIS: 090
OS_SPHERE: +4.00
OS_CYLINDER: -1.50
OD_SPHERE: +3.25
OD_VA1: 20/20
OS_CYLINDER: -1.25
OD_SPHERE: +3.50
OS_VA1: 20/20-2
OD_AXIS: 090
OD_AXIS: 090
OS_ADD: +2.50
OS_VA2: 20/20
OD_SPHERE: +3.00
OS_ADD: +3.00
OS_CYLINDER: -1.00
OD_CYLINDER: -1.00
OD_VA1: 20/25
OD_ADD: +2.50
OD_CYLINDER: -1.50
OS_AXIS: 085
OS_SPHERE: +4.00
OD_VA2: 20/20
OD_VA1: 20/25
OS_VA1: 20/20
OD_ADD: +3.00
OD_AXIS: 090

## 2023-12-14 ASSESSMENT — REFRACTION_AUTOREFRACTION
OD_AXIS: 091
OD_SPHERE: +3.25
OS_SPHERE: +4.00
OD_CYLINDER: -1.50
OS_CYLINDER: -1.25
OS_AXIS: 088

## 2023-12-14 ASSESSMENT — SUPERFICIAL PUNCTATE KERATITIS (SPK)
OS_SPK: 1+
OD_SPK: 1+

## 2023-12-14 ASSESSMENT — CONFRONTATIONAL VISUAL FIELD TEST (CVF)
OS_FINDINGS: FULL
OD_FINDINGS: FULL

## 2023-12-14 ASSESSMENT — CORNEAL PTERYGIUM: OS_PTERYGIUM: 1MM

## 2023-12-20 ENCOUNTER — APPOINTMENT (OUTPATIENT)
Dept: MRI IMAGING | Facility: CLINIC | Age: 73
End: 2023-12-20
Payer: MEDICARE

## 2023-12-20 ENCOUNTER — OUTPATIENT (OUTPATIENT)
Dept: OUTPATIENT SERVICES | Facility: HOSPITAL | Age: 73
LOS: 1 days | End: 2023-12-20
Payer: MEDICARE

## 2023-12-20 DIAGNOSIS — Z00.8 ENCOUNTER FOR OTHER GENERAL EXAMINATION: ICD-10-CM

## 2023-12-20 DIAGNOSIS — Z98.890 OTHER SPECIFIED POSTPROCEDURAL STATES: Chronic | ICD-10-CM

## 2023-12-20 PROCEDURE — 77049 MRI BREAST C-+ W/CAD BI: CPT | Mod: 26,MH

## 2023-12-20 PROCEDURE — A9585: CPT

## 2023-12-20 PROCEDURE — C8908: CPT | Mod: MH

## 2023-12-20 PROCEDURE — C8937: CPT

## 2024-01-25 ENCOUNTER — OFFICE (OUTPATIENT)
Dept: URBAN - METROPOLITAN AREA CLINIC 70 | Facility: CLINIC | Age: 74
Setting detail: OPHTHALMOLOGY
End: 2024-01-25
Payer: MEDICARE

## 2024-01-25 DIAGNOSIS — H52.7: ICD-10-CM

## 2024-01-25 PROCEDURE — RX/CHECK RX/CHECK: Performed by: STUDENT IN AN ORGANIZED HEALTH CARE EDUCATION/TRAINING PROGRAM

## 2024-01-25 ASSESSMENT — SPHEQUIV_DERIVED
OS_SPHEQUIV: 3.25
OS_SPHEQUIV: 3.25
OD_SPHEQUIV: 2.25
OS_SPHEQUIV: 3
OS_SPHEQUIV: 3.625
OD_SPHEQUIV: 2.75
OD_SPHEQUIV: 2.75
OS_SPHEQUIV: 3.375
OD_SPHEQUIV: 2.875
OD_SPHEQUIV: 2.75

## 2024-01-25 ASSESSMENT — REFRACTION_MANIFEST
OS_SPHERE: +4.00
OS_CYLINDER: -1.25
OS_VA1: 20/20
OS_SPHERE: +4.00
OD_VA1: 20/20
OD_SPHERE: +3.00
OS_ADD: +2.50
OD_VA1: 20/25
OS_SPHERE: +3.50
OD_SPHERE: +3.50
OD_SPHERE: +3.50
OD_CYLINDER: -1.50
OD_AXIS: 090
OD_VA1: 20/25
OS_CYLINDER: -1.50
OD_CYLINDER: -1.00
OS_AXIS: 090
OD_VA2: 20/20
OS_AXIS: 085
OU_VA: 20/20
OD_ADD: +2.50
OS_SPHERE: +4.00
OS_VA1: 20/20-2
OD_CYLINDER: -1.50
OS_AXIS: 090
OS_CYLINDER: -1.50
OD_SPHERE: +3.25
OS_ADD: +3.00
OS_VA1: 20/20-2
OS_AXIS: 090
OS_VA1: 20/20
OD_ADD: +3.00
OD_AXIS: 090
OD_AXIS: 090
OS_VA2: 20/20
OD_VA1: 20/20
OS_CYLINDER: -1.00
OD_CYLINDER: -1.50
OD_AXIS: 090

## 2024-01-25 ASSESSMENT — REFRACTION_CURRENTRX
OS_OVR_VA: 20/
OD_SPHERE: +3.00
OD_ADD: +2.20
OS_CYLINDER: -1.75
OD_ADD: +2.25
OD_CYLINDER: -0.75
OS_OVR_VA: 20/
OD_OVR_VA: 20/
OS_ADD: +2.25
OD_SPHERE: +2.75
OS_VPRISM_DIRECTION: SV
OS_SPHERE: +4.50
OS_SPHERE: +3.25
OS_CYLINDER: -0.50
OD_AXIS: 088
OS_AXIS: 067
OD_VPRISM_DIRECTION: SV
OS_VPRISM_DIRECTION: PROGS
OS_SPHERE: +3.00
OD_VPRISM_DIRECTION: PROGS
OD_CYLINDER: -0.50
OD_SPHERE: +3.00
OD_AXIS: 100
OS_AXIS: 077
OD_OVR_VA: 20/
OS_CYLINDER: -0.75
OS_ADD: +2.20
OS_OVR_VA: 20/
OD_OVR_VA: 20/
OD_AXIS: 091
OD_CYLINDER: -1.25
OS_AXIS: 087

## 2024-01-25 ASSESSMENT — CONFRONTATIONAL VISUAL FIELD TEST (CVF)
OD_FINDINGS: FULL
OS_FINDINGS: FULL

## 2024-01-25 ASSESSMENT — REFRACTION_AUTOREFRACTION
OD_SPHERE: +3.75
OS_CYLINDER: -1.75
OS_SPHERE: +4.50
OS_AXIS: 087
OD_CYLINDER: -1.75
OD_AXIS: 091

## 2024-01-25 ASSESSMENT — CORNEAL PTERYGIUM: OS_PTERYGIUM: 1MM

## 2024-01-25 ASSESSMENT — SUPERFICIAL PUNCTATE KERATITIS (SPK)
OS_SPK: 1+
OD_SPK: 1+

## 2024-04-04 ENCOUNTER — OUTPATIENT (OUTPATIENT)
Dept: OUTPATIENT SERVICES | Facility: HOSPITAL | Age: 74
LOS: 1 days | End: 2024-04-04
Payer: MEDICARE

## 2024-04-04 ENCOUNTER — APPOINTMENT (OUTPATIENT)
Dept: MAMMOGRAPHY | Facility: CLINIC | Age: 74
End: 2024-04-04
Payer: MEDICARE

## 2024-04-04 ENCOUNTER — APPOINTMENT (OUTPATIENT)
Dept: ULTRASOUND IMAGING | Facility: CLINIC | Age: 74
End: 2024-04-04
Payer: MEDICARE

## 2024-04-04 DIAGNOSIS — Z98.890 OTHER SPECIFIED POSTPROCEDURAL STATES: Chronic | ICD-10-CM

## 2024-04-04 DIAGNOSIS — Z00.8 ENCOUNTER FOR OTHER GENERAL EXAMINATION: ICD-10-CM

## 2024-04-04 PROCEDURE — 76641 ULTRASOUND BREAST COMPLETE: CPT

## 2024-04-04 PROCEDURE — 77063 BREAST TOMOSYNTHESIS BI: CPT | Mod: 26

## 2024-04-04 PROCEDURE — 76641 ULTRASOUND BREAST COMPLETE: CPT | Mod: 26,50,GY

## 2024-04-04 PROCEDURE — 77067 SCR MAMMO BI INCL CAD: CPT

## 2024-04-04 PROCEDURE — 77067 SCR MAMMO BI INCL CAD: CPT | Mod: 26

## 2024-04-04 PROCEDURE — 77063 BREAST TOMOSYNTHESIS BI: CPT

## 2024-07-08 ENCOUNTER — NON-APPOINTMENT (OUTPATIENT)
Age: 74
End: 2024-07-08

## 2024-07-29 ENCOUNTER — OFFICE (OUTPATIENT)
Dept: URBAN - METROPOLITAN AREA CLINIC 102 | Facility: CLINIC | Age: 74
Setting detail: OPHTHALMOLOGY
End: 2024-07-29
Payer: MEDICARE

## 2024-07-29 DIAGNOSIS — H40.033: ICD-10-CM

## 2024-07-29 DIAGNOSIS — H11.042: ICD-10-CM

## 2024-07-29 DIAGNOSIS — H43.811: ICD-10-CM

## 2024-07-29 DIAGNOSIS — H25.13: ICD-10-CM

## 2024-07-29 PROCEDURE — 92014 COMPRE OPH EXAM EST PT 1/>: CPT | Performed by: OPHTHALMOLOGY

## 2024-07-29 PROCEDURE — 92020 GONIOSCOPY: CPT | Performed by: OPHTHALMOLOGY

## 2024-07-29 ASSESSMENT — CONFRONTATIONAL VISUAL FIELD TEST (CVF)
OD_FINDINGS: FULL
OS_FINDINGS: FULL

## 2024-09-23 ENCOUNTER — APPOINTMENT (OUTPATIENT)
Dept: SURGERY | Facility: CLINIC | Age: 74
End: 2024-09-23
Payer: MEDICARE

## 2024-09-23 PROCEDURE — 99213K: CUSTOM

## 2025-01-23 ENCOUNTER — APPOINTMENT (OUTPATIENT)
Dept: MRI IMAGING | Facility: CLINIC | Age: 75
End: 2025-01-23
Payer: MEDICARE

## 2025-01-23 ENCOUNTER — OUTPATIENT (OUTPATIENT)
Dept: OUTPATIENT SERVICES | Facility: HOSPITAL | Age: 75
LOS: 1 days | End: 2025-01-23
Payer: MEDICARE

## 2025-01-23 DIAGNOSIS — Z98.890 OTHER SPECIFIED POSTPROCEDURAL STATES: Chronic | ICD-10-CM

## 2025-01-23 DIAGNOSIS — Z00.8 ENCOUNTER FOR OTHER GENERAL EXAMINATION: ICD-10-CM

## 2025-01-23 PROCEDURE — 77049 MRI BREAST C-+ W/CAD BI: CPT | Mod: 26

## 2025-01-23 PROCEDURE — C8937: CPT

## 2025-01-23 PROCEDURE — C8908: CPT | Mod: MH

## 2025-01-23 PROCEDURE — A9585: CPT

## 2025-01-30 ENCOUNTER — OFFICE (OUTPATIENT)
Dept: URBAN - METROPOLITAN AREA CLINIC 102 | Facility: CLINIC | Age: 75
Setting detail: OPHTHALMOLOGY
End: 2025-01-30
Payer: MEDICARE

## 2025-01-30 DIAGNOSIS — H40.033: ICD-10-CM

## 2025-01-30 DIAGNOSIS — D31.32: ICD-10-CM

## 2025-01-30 DIAGNOSIS — H11.042: ICD-10-CM

## 2025-01-30 DIAGNOSIS — D31.31: ICD-10-CM

## 2025-01-30 DIAGNOSIS — H35.54: ICD-10-CM

## 2025-01-30 DIAGNOSIS — H25.13: ICD-10-CM

## 2025-01-30 DIAGNOSIS — H43.811: ICD-10-CM

## 2025-01-30 DIAGNOSIS — H52.4: ICD-10-CM

## 2025-01-30 PROCEDURE — 92014 COMPRE OPH EXAM EST PT 1/>: CPT | Performed by: OPHTHALMOLOGY

## 2025-01-30 PROCEDURE — 92133 CPTRZD OPH DX IMG PST SGM ON: CPT | Performed by: OPHTHALMOLOGY

## 2025-01-30 PROCEDURE — 92083 EXTENDED VISUAL FIELD XM: CPT | Performed by: OPHTHALMOLOGY

## 2025-01-30 PROCEDURE — 92015 DETERMINE REFRACTIVE STATE: CPT | Performed by: OPHTHALMOLOGY

## 2025-01-30 PROCEDURE — 92020 GONIOSCOPY: CPT | Performed by: OPHTHALMOLOGY

## 2025-01-30 ASSESSMENT — REFRACTION_MANIFEST
OD_VA1: 20/25
OD_AXIS: 090
OD_VA2: 20/20
OD_SPHERE: +3.00
OD_SPHERE: +3.50
OS_ADD: +2.50
OD_AXIS: 090
OD_AXIS: 090
OD_ADD: +2.50
OU_VA: 20/20
OD_CYLINDER: -1.50
OD_CYLINDER: -1.00
OS_VA2: 20/20
OS_ADD: +2.50
OS_VA1: 20/20
OD_SPHERE: +3.25
OS_SPHERE: +4.00
OS_VA1: 20/20-2
OD_VA1: 20/20
OD_CYLINDER: -1.50
OS_AXIS: 085
OD_VA1: 20/25-
OS_SPHERE: +3.75
OS_CYLINDER: -1.00
OS_CYLINDER: -1.50
OD_SPHERE: +3.50
OS_AXIS: 090
OD_ADD: +3.00
OD_VA1: 20/20
OD_AXIS: 090
OS_AXIS: 080
OS_SPHERE: +4.00
OS_AXIS: 090
OS_ADD: +3.00
OD_CYLINDER: -1.75
OS_CYLINDER: -1.25
OD_ADD: +2.50
OS_VA1: 20/20
OS_CYLINDER: -2.00
OS_VA1: 20/20
OS_SPHERE: +3.50

## 2025-01-30 ASSESSMENT — REFRACTION_CURRENTRX
OS_AXIS: 080
OS_SPHERE: +3.75
OS_CYLINDER: -1.00
OS_ADD: +2.75
OD_SPHERE: +2.75
OD_VPRISM_DIRECTION: PROGS
OS_ADD: +2.25
OS_SPHERE: +3.25
OS_OVR_VA: 20/
OS_AXIS: 087
OS_VPRISM_DIRECTION: PROGS
OD_SPHERE: +2.75
OS_AXIS: 077
OD_SPHERE: +3.00
OS_SPHERE: +4.50
OS_CYLINDER: -0.75
OD_ADD: +2.75
OS_OVR_VA: 20/
OS_VPRISM_DIRECTION: PROGS
OD_AXIS: 086
OD_OVR_VA: 20/
OS_CYLINDER: -1.75
OD_AXIS: 088
OD_AXIS: 091
OD_VPRISM_DIRECTION: PROGS
OD_OVR_VA: 20/
OD_OVR_VA: 20/
OD_CYLINDER: -1.25
OD_ADD: +2.25
OS_OVR_VA: 20/
OD_CYLINDER: -0.75
OD_CYLINDER: -1.25

## 2025-01-30 ASSESSMENT — CONFRONTATIONAL VISUAL FIELD TEST (CVF)
OD_FINDINGS: FULL
OS_FINDINGS: FULL

## 2025-01-30 ASSESSMENT — KERATOMETRY
OS_K1POWER_DIOPTERS: 42.25
OD_K2POWER_DIOPTERS: 43.50
OD_K1POWER_DIOPTERS: 42.00
OD_AXISANGLE_DEGREES: 005
OS_K2POWER_DIOPTERS: 43.50
OS_AXISANGLE_DEGREES: 161
METHOD_AUTO_MANUAL: AUTO

## 2025-01-30 ASSESSMENT — REFRACTION_AUTOREFRACTION
OS_CYLINDER: -2.00
OS_AXIS: 078
OD_SPHERE: +3.75
OD_CYLINDER: -2.00
OD_AXIS: 091
OS_SPHERE: +4.25

## 2025-01-30 ASSESSMENT — TONOMETRY
OD_IOP_MMHG: 21
OD_IOP_MMHG: 16
OS_IOP_MMHG: 20
OS_IOP_MMHG: 16

## 2025-01-30 ASSESSMENT — VISUAL ACUITY
OS_BCVA: 20/25-2
OD_BCVA: 20/30-1

## 2025-07-11 ENCOUNTER — APPOINTMENT (OUTPATIENT)
Dept: ULTRASOUND IMAGING | Facility: CLINIC | Age: 75
End: 2025-07-11

## 2025-07-11 ENCOUNTER — APPOINTMENT (OUTPATIENT)
Dept: MAMMOGRAPHY | Facility: CLINIC | Age: 75
End: 2025-07-11

## 2025-07-11 ENCOUNTER — OUTPATIENT (OUTPATIENT)
Dept: OUTPATIENT SERVICES | Facility: HOSPITAL | Age: 75
LOS: 1 days | End: 2025-07-11
Payer: MEDICARE

## 2025-07-11 DIAGNOSIS — Z98.890 OTHER SPECIFIED POSTPROCEDURAL STATES: Chronic | ICD-10-CM

## 2025-07-11 DIAGNOSIS — Z00.8 ENCOUNTER FOR OTHER GENERAL EXAMINATION: ICD-10-CM

## 2025-07-11 PROCEDURE — 77063 BREAST TOMOSYNTHESIS BI: CPT

## 2025-07-11 PROCEDURE — 77067 SCR MAMMO BI INCL CAD: CPT

## 2025-07-11 PROCEDURE — 76641 ULTRASOUND BREAST COMPLETE: CPT | Mod: 26,50,GA

## 2025-07-11 PROCEDURE — 77067 SCR MAMMO BI INCL CAD: CPT | Mod: 26

## 2025-07-11 PROCEDURE — 77063 BREAST TOMOSYNTHESIS BI: CPT | Mod: 26

## 2025-07-11 PROCEDURE — 76641 ULTRASOUND BREAST COMPLETE: CPT

## 2025-07-31 ENCOUNTER — OFFICE (OUTPATIENT)
Dept: URBAN - METROPOLITAN AREA CLINIC 102 | Facility: CLINIC | Age: 75
Setting detail: OPHTHALMOLOGY
End: 2025-07-31
Payer: MEDICARE

## 2025-07-31 DIAGNOSIS — H35.54: ICD-10-CM

## 2025-07-31 DIAGNOSIS — H40.033: ICD-10-CM

## 2025-07-31 DIAGNOSIS — H25.13: ICD-10-CM

## 2025-07-31 DIAGNOSIS — H11.042: ICD-10-CM

## 2025-07-31 DIAGNOSIS — D31.32: ICD-10-CM

## 2025-07-31 DIAGNOSIS — H43.811: ICD-10-CM

## 2025-07-31 DIAGNOSIS — H02.833: ICD-10-CM

## 2025-07-31 PROCEDURE — 92014 COMPRE OPH EXAM EST PT 1/>: CPT | Performed by: OPHTHALMOLOGY

## 2025-07-31 PROCEDURE — 92020 GONIOSCOPY: CPT | Performed by: OPHTHALMOLOGY

## 2025-07-31 ASSESSMENT — REFRACTION_MANIFEST
OD_VA1: 20/25
OD_CYLINDER: -1.75
OD_CYLINDER: -1.00
OD_ADD: +2.50
OD_SPHERE: +3.00
OS_SPHERE: +3.75
OS_AXIS: 090
OS_SPHERE: +3.50
OD_VA1: 20/20
OS_VA1: 20/20-2
OD_ADD: +3.00
OD_SPHERE: +3.50
OS_SPHERE: +4.00
OU_VA: 20/20
OS_ADD: +3.00
OD_AXIS: 090
OS_CYLINDER: -1.00
OD_ADD: +2.50
OD_SPHERE: +3.25
OS_VA1: 20/20
OD_CYLINDER: -1.50
OS_ADD: +2.50
OS_AXIS: 085
OD_VA1: 20/25-
OS_VA1: 20/20
OS_SPHERE: +4.00
OS_AXIS: 080
OS_CYLINDER: -1.25
OS_CYLINDER: -1.50
OS_VA2: 20/20
OS_CYLINDER: -2.00
OD_AXIS: 090
OD_SPHERE: +3.50
OD_VA1: 20/20
OD_AXIS: 090
OD_AXIS: 090
OS_AXIS: 090
OS_VA1: 20/20
OS_ADD: +2.50
OD_VA2: 20/20
OD_CYLINDER: -1.50

## 2025-07-31 ASSESSMENT — REFRACTION_CURRENTRX
OS_AXIS: 080
OS_OVR_VA: 20/
OD_SPHERE: +2.75
OD_ADD: +2.75
OS_OVR_VA: 20/
OS_SPHERE: +4.50
OD_SPHERE: +2.75
OS_VPRISM_DIRECTION: PROGS
OD_ADD: +2.25
OS_AXIS: 077
OD_AXIS: 088
OS_ADD: +2.25
OD_AXIS: 091
OS_SPHERE: +3.25
OD_SPHERE: +3.00
OS_AXIS: 087
OS_ADD: +2.75
OD_OVR_VA: 20/
OD_AXIS: 086
OD_CYLINDER: -0.75
OS_VPRISM_DIRECTION: PROGS
OD_CYLINDER: -1.25
OS_OVR_VA: 20/
OD_VPRISM_DIRECTION: PROGS
OS_SPHERE: +3.75
OD_VPRISM_DIRECTION: PROGS
OS_CYLINDER: -1.75
OS_CYLINDER: -1.00
OS_CYLINDER: -0.75
OD_CYLINDER: -1.25
OD_OVR_VA: 20/
OD_OVR_VA: 20/

## 2025-07-31 ASSESSMENT — CONFRONTATIONAL VISUAL FIELD TEST (CVF)
OS_FINDINGS: FULL
OD_FINDINGS: FULL

## 2025-07-31 ASSESSMENT — VISUAL ACUITY
OS_BCVA: 20/25-2
OD_BCVA: 20/30-2

## 2025-07-31 ASSESSMENT — TONOMETRY: OS_IOP_MMHG: 21

## 2025-08-19 ENCOUNTER — OFFICE (OUTPATIENT)
Dept: URBAN - METROPOLITAN AREA CLINIC 102 | Facility: CLINIC | Age: 75
Setting detail: OPHTHALMOLOGY
End: 2025-08-19
Payer: MEDICARE

## 2025-08-19 DIAGNOSIS — H02.834: ICD-10-CM

## 2025-08-19 DIAGNOSIS — H02.831: ICD-10-CM

## 2025-08-19 DIAGNOSIS — H53.40: ICD-10-CM

## 2025-08-19 PROBLEM — Z41.1 ENCOUNTER FOR COSMETIC SURGERY: Status: ACTIVE | Noted: 2025-08-19

## 2025-08-19 PROCEDURE — 92285 EXTERNAL OCULAR PHOTOGRAPHY: CPT | Performed by: OPHTHALMOLOGY

## 2025-08-19 PROCEDURE — 92014 COMPRE OPH EXAM EST PT 1/>: CPT | Performed by: OPHTHALMOLOGY

## 2025-08-19 PROCEDURE — 92081 LIMITED VISUAL FIELD XM: CPT | Performed by: OPHTHALMOLOGY

## 2025-08-19 ASSESSMENT — LID EXAM ASSESSMENTS
OD_MEDIAL_FAT_PROLAPSE: 2+
OS_LATERAL_FAT_PROLAPSE: 2+
OS_CENTRAL_FAT_PROLAPSE: 2+
OD_LATERAL_FAT_PROLAPSE: 2+
OS_COMMENTS: 2+ LATERAL FAT PROLAPS
OS_FRONTALIS_USE: 3+
OS_COMMENTS: 2+ MEDIAL FAT PROLAPSE
OD_COMMENTS: 2+ LATERAL FAT PROLAPS
OD_CENTRAL_FAT_PROLAPSE: 2+
OD_FRONTALIS_USE: 3+

## 2025-08-19 ASSESSMENT — LID POSITION - DERMATOCHALASIS
OS_DERMATOCHALASIS: LUL
OD_DERMATOCHALASIS: RUL

## 2025-08-19 ASSESSMENT — KERATOMETRY
OD_K1POWER_DIOPTERS: DEFER
OS_K1POWER_DIOPTERS: DEFER
METHOD_AUTO_MANUAL: AUTO

## 2025-08-19 ASSESSMENT — REFRACTION_AUTOREFRACTION
OS_SPHERE: DEFER
OD_SPHERE: DEFER

## 2025-08-19 ASSESSMENT — CONFRONTATIONAL VISUAL FIELD TEST (CVF)
OS_FINDINGS: FULL
OD_FINDINGS: FULL

## 2025-08-19 ASSESSMENT — VISUAL ACUITY
OD_BCVA: 20/30-1
OS_BCVA: 20/30